# Patient Record
Sex: MALE | Race: BLACK OR AFRICAN AMERICAN | NOT HISPANIC OR LATINO | URBAN - METROPOLITAN AREA
[De-identification: names, ages, dates, MRNs, and addresses within clinical notes are randomized per-mention and may not be internally consistent; named-entity substitution may affect disease eponyms.]

---

## 2020-07-04 ENCOUNTER — EMERGENCY (EMERGENCY)
Facility: HOSPITAL | Age: 30
LOS: 1 days | Discharge: HOME | End: 2020-07-04
Attending: EMERGENCY MEDICINE
Payer: MEDICAID

## 2020-07-04 VITALS
RESPIRATION RATE: 17 BRPM | OXYGEN SATURATION: 95 % | HEART RATE: 51 BPM | SYSTOLIC BLOOD PRESSURE: 118 MMHG | TEMPERATURE: 98 F | DIASTOLIC BLOOD PRESSURE: 74 MMHG

## 2020-07-04 VITALS
TEMPERATURE: 98 F | HEIGHT: 72 IN | SYSTOLIC BLOOD PRESSURE: 129 MMHG | HEART RATE: 62 BPM | RESPIRATION RATE: 18 BRPM | WEIGHT: 244.93 LBS | DIASTOLIC BLOOD PRESSURE: 75 MMHG | OXYGEN SATURATION: 97 %

## 2020-07-04 PROCEDURE — 99283 EMERGENCY DEPT VISIT LOW MDM: CPT

## 2020-07-04 RX ADMIN — Medication 200 MILLIGRAM(S): at 01:17

## 2020-07-04 NOTE — ED PROVIDER NOTE - NS ED ROS FT
General: no fever  Head: no headache  Eyes: no vision change  ENT: no nasal discharge/congestion, no sore throat  CV: no chest pain  Resp: no SOB, no cough  GI: no N/V/D, no abdominal pain  : no dysuria  MSK: no joint pain, no muscle aches, no neck pain or back pain  Skin: +lesion at tick bite in right groin  Neuro: no focal weakness, no change in sensation

## 2020-07-04 NOTE — ED PROVIDER NOTE - PHYSICAL EXAMINATION
General: well-appearing man in no acute distress  Head: normocephalic, atraumatic  Eyes: PERRL  Mouth: moist mucous membranes  Neck: supple neck, full ROM, no lymphadenopathy  CV: normal rate and rhythm, peripheral pulses 2+ bilaterally  Respiratory: clear to auscultation bilaterally  Abdomen: soft, nontender, nondistended  Neuro: alert and oriented x3, speech clear, strength 5/5 UE and LE bilaterally, ambulating without difficulty  Skin: pt refused skin exam to evaluate tick bite site  Extremities: no tenderness to palpation of joints

## 2020-07-04 NOTE — ED PROVIDER NOTE - PATIENT PORTAL LINK FT
You can access the FollowMyHealth Patient Portal offered by [facility] by registering at the following website: [email or web address]. By joining [facility]’s FollowMyHealth portal, you will also be able to view your health information using other applications (apps) compatible with our system.

## 2020-07-04 NOTE — ED PROVIDER NOTE - ATTENDING CONTRIBUTION TO CARE
MD Castrejon:  patient seen and evaluated personally.   I agree with the History & Physical,  Impression & Plan other than what was detailed in my note.  MD Casterjon    see mdm

## 2020-07-04 NOTE — ED PROVIDER NOTE - NSFOLLOWUPINSTRUCTIONS_ED_ALL_ED_FT
You were seen an evaluated in the emergency room for a tick bite and was given a dose of antibiotics for prophylaxis.    Please follow up with your primary care provider in the next few days.    Seek immediate medical attention for any worsening, new, or concerning symptoms.    Please read all attached.

## 2020-07-04 NOTE — ED PROVIDER NOTE - CLINICAL SUMMARY MEDICAL DECISION MAKING FREE TEXT BOX
Attending Cash:  28 y/o m no sig pmh  presenting to ed w/ cc of tick found on his left groin earlier today, pt was in woods [place] hike,  Pt took off tick off, denies classic lyme rash, no joint pains, cp, sob, palp, n/v, diarrhea. pt will not allow us to examine his groin because it was a "sensitive area". discussed would like to look for retained head, erythema migrans, infection but still deferred. afebrile vitals stable, non toxic, well appearing, no rashes noted. plan for prophylactic doxy x 1. dc

## 2020-07-04 NOTE — ED ADULT NURSE NOTE - NSIMPLEMENTINTERV_GEN_ALL_ED
Implemented All Universal Safety Interventions:  [place] to call system. Call bell, personal items and telephone within reach. Instruct patient to call for assistance. Room bathroom lighting operational. Non-slip footwear when patient is off stretcher. Physically safe environment: no spills, clutter or unnecessary equipment. Stretcher in lowest position, wheels locked, appropriate side rails in place.

## 2020-07-04 NOTE — ED PROVIDER NOTE - OBJECTIVE STATEMENT
28yo man no PMH presents after finding a tick in right groin after going on a trip into the woods 3 days ago but unclear how long tick was attached. He notes a small red dot at the area where tick was removed. He denies fevers, n/v/d, abdominal pain, cough, chest pain, SOB, joint pain, focal weakness or change in sensation.